# Patient Record
Sex: FEMALE | Race: ASIAN | Employment: FULL TIME | URBAN - METROPOLITAN AREA
[De-identification: names, ages, dates, MRNs, and addresses within clinical notes are randomized per-mention and may not be internally consistent; named-entity substitution may affect disease eponyms.]

---

## 2017-01-22 DIAGNOSIS — I10 ESSENTIAL HYPERTENSION, BENIGN: Primary | ICD-10-CM

## 2017-01-23 RX ORDER — LISINOPRIL 5 MG/1
TABLET ORAL
Qty: 90 TABLET | Refills: 1 | Status: SHIPPED | OUTPATIENT
Start: 2017-01-23 | End: 2017-08-08

## 2017-01-23 NOTE — TELEPHONE ENCOUNTER
Lisinopril      Last Written Prescription Date: 8/22/2016  Last Fill Quantity: 90, # refills: 1  Last Office Visit with G, P or [facility] prescribing provider: 8/22/2016       POTASSIUM   Date Value Ref Range Status   05/26/2016 3.9 3.4 - 5.3 mmol/L Final     CREATININE   Date Value Ref Range Status   05/26/2016 0.65 0.52 - 1.04 mg/dL Final   01/25/2014 0.59 0.52 - 1.04 mg/dL Final     BP Readings from Last 3 Encounters:   08/22/16 119/77   07/20/16 118/82   05/26/16 120/90     Prescription approved per [facility], P or [facility] refill protocol.  Marylin Moser RN  [facility] [employer]

## 2017-08-08 DIAGNOSIS — I10 ESSENTIAL HYPERTENSION, BENIGN: ICD-10-CM

## 2017-08-08 NOTE — TELEPHONE ENCOUNTER
Lisinopirl      Last Written Prescription Date: 1/23/17  Last Fill Quantity: 90, # refills: 1  Last Office Visit with G, P or [facility] prescribing provider: 8/22/16       Potassium   Date Value Ref Range Status   05/26/2016 3.9 3.4 - 5.3 mmol/L Final     Creatinine   Date Value Ref Range Status   05/26/2016 0.65 0.52 - 1.04 mg/dL Final     BP Readings from Last 3 Encounters:   08/22/16 119/77   07/20/16 118/82   05/26/16 120/90

## 2017-08-09 NOTE — TELEPHONE ENCOUNTER
Routing refill request to provider for review/approval because:  Labs not current:  See below    Zara Botello RN  [facility]  [phone]

## 2017-08-10 RX ORDER — LISINOPRIL 5 MG/1
TABLET ORAL
Qty: 30 TABLET | Refills: 0 | Status: SHIPPED | OUTPATIENT
Start: 2017-08-10

## 2017-08-19 DIAGNOSIS — I10 ESSENTIAL HYPERTENSION, BENIGN: ICD-10-CM

## 2017-08-21 RX ORDER — LISINOPRIL 5 MG/1
TABLET ORAL
Qty: 30 TABLET | OUTPATIENT
Start: 2017-08-21

## 2017-08-21 NOTE — TELEPHONE ENCOUNTER
lisinopril (PRINIVIL/ZESTRIL) 5 MG tablet   Last Written Prescription Date: 8/10/17  Last Fill Quantity: 30, # refills: 0  Last Office Visit with G, P or [facility] prescribing provider: 8/22/16    I reached the patient and we set up a px and fasting labs per her request.  She does not need refills before then  Opal Moya RN- [facility] [employer]         Potassium   Date Value Ref Range Status   05/26/2016 3.9 3.4 - 5.3 mmol/L Final     Creatinine   Date Value Ref Range Status   05/26/2016 0.65 0.52 - 1.04 mg/dL Final     BP Readings from Last 3 Encounters:   08/22/16 119/77   07/20/16 118/82   05/26/16 120/90

## 2017-08-30 ENCOUNTER — OFFICE VISIT (OUTPATIENT)
Dept: FAMILY MEDICINE | Facility: CLINIC | Age: 37
End: 2017-08-30
Payer: COMMERCIAL

## 2017-08-30 VITALS
HEART RATE: 73 BPM | TEMPERATURE: 98.8 F | HEIGHT: 64 IN | DIASTOLIC BLOOD PRESSURE: 80 MMHG | OXYGEN SATURATION: 100 % | SYSTOLIC BLOOD PRESSURE: 125 MMHG | BODY MASS INDEX: 22.2 KG/M2 | WEIGHT: 130 LBS

## 2017-08-30 DIAGNOSIS — I10 ESSENTIAL HYPERTENSION, BENIGN: ICD-10-CM

## 2017-08-30 DIAGNOSIS — B07.0 PLANTAR WARTS: ICD-10-CM

## 2017-08-30 DIAGNOSIS — E78.5 HYPERLIPIDEMIA WITH TARGET LDL LESS THAN 100: ICD-10-CM

## 2017-08-30 DIAGNOSIS — Z00.00 ROUTINE GENERAL MEDICAL EXAMINATION AT A HEALTH CARE FACILITY: Primary | ICD-10-CM

## 2017-08-30 DIAGNOSIS — E55.9 VITAMIN D DEFICIENCY: ICD-10-CM

## 2017-08-30 LAB
ALBUMIN SERPL-MCNC: 4.1 G/DL (ref 3.4–5)
ALP SERPL-CCNC: 44 U/L (ref 40–150)
ALT SERPL W P-5'-P-CCNC: 20 U/L (ref 0–50)
ANION GAP SERPL CALCULATED.3IONS-SCNC: 8 MMOL/L (ref 3–14)
AST SERPL W P-5'-P-CCNC: 19 U/L (ref 0–45)
BILIRUB SERPL-MCNC: 0.6 MG/DL (ref 0.2–1.3)
BUN SERPL-MCNC: 12 MG/DL (ref 7–30)
CALCIUM SERPL-MCNC: 8.7 MG/DL (ref 8.5–10.1)
CHLORIDE SERPL-SCNC: 105 MMOL/L (ref 94–109)
CHOLEST SERPL-MCNC: 214 MG/DL
CO2 SERPL-SCNC: 23 MMOL/L (ref 20–32)
CREAT SERPL-MCNC: 0.83 MG/DL (ref 0.52–1.04)
ERYTHROCYTE [DISTWIDTH] IN BLOOD BY AUTOMATED COUNT: 15.8 % (ref 10–15)
GFR SERPL CREATININE-BSD FRML MDRD: 76 ML/MIN/1.7M2
GLUCOSE SERPL-MCNC: 79 MG/DL (ref 70–99)
HCT VFR BLD AUTO: 34.8 % (ref 35–47)
HDLC SERPL-MCNC: 84 MG/DL
HGB BLD-MCNC: 11.3 G/DL (ref 11.7–15.7)
LDLC SERPL CALC-MCNC: 115 MG/DL
MCH RBC QN AUTO: 25.9 PG (ref 26.5–33)
MCHC RBC AUTO-ENTMCNC: 32.5 G/DL (ref 31.5–36.5)
MCV RBC AUTO: 80 FL (ref 78–100)
NONHDLC SERPL-MCNC: 130 MG/DL
PLATELET # BLD AUTO: 238 10E9/L (ref 150–450)
POTASSIUM SERPL-SCNC: 3.7 MMOL/L (ref 3.4–5.3)
PROT SERPL-MCNC: 7.9 G/DL (ref 6.8–8.8)
RBC # BLD AUTO: 4.37 10E12/L (ref 3.8–5.2)
SODIUM SERPL-SCNC: 136 MMOL/L (ref 133–144)
TRIGL SERPL-MCNC: 74 MG/DL
WBC # BLD AUTO: 5.5 10E9/L (ref 4–11)

## 2017-08-30 PROCEDURE — 85027 COMPLETE CBC AUTOMATED: CPT | Performed by: FAMILY MEDICINE

## 2017-08-30 PROCEDURE — 17110 DESTRUCTION B9 LES UP TO 14: CPT | Performed by: FAMILY MEDICINE

## 2017-08-30 PROCEDURE — 80061 LIPID PANEL: CPT | Performed by: FAMILY MEDICINE

## 2017-08-30 PROCEDURE — 36415 COLL VENOUS BLD VENIPUNCTURE: CPT | Performed by: FAMILY MEDICINE

## 2017-08-30 PROCEDURE — 80053 COMPREHEN METABOLIC PANEL: CPT | Performed by: FAMILY MEDICINE

## 2017-08-30 PROCEDURE — 99395 PREV VISIT EST AGE 18-39: CPT | Mod: 25 | Performed by: FAMILY MEDICINE

## 2017-08-30 PROCEDURE — 82306 VITAMIN D 25 HYDROXY: CPT | Performed by: FAMILY MEDICINE

## 2017-08-30 PROCEDURE — 99213 OFFICE O/P EST LOW 20 MIN: CPT | Mod: 25 | Performed by: FAMILY MEDICINE

## 2017-08-30 RX ORDER — LISINOPRIL 2.5 MG/1
2.5 TABLET ORAL DAILY
Qty: 90 TABLET | Refills: 1 | Status: SHIPPED | OUTPATIENT
Start: 2017-08-30 | End: 2018-09-15

## 2017-08-30 NOTE — NURSING NOTE
"Chief Complaint   Patient presents with     Physical       Initial /80  Pulse 73  Temp 98.8  F (37.1  C) (Tympanic)  Ht 5' 4\" (1.626 m)  Wt 130 lb (59 kg)  LMP 08/11/2017  SpO2 100%  BMI 22.31 kg/m2 Estimated body mass index is 22.31 kg/(m^2) as calculated from the following:    Height as of this encounter: 5' 4\" (1.626 m).    Weight as of this encounter: 130 lb (59 kg).  Medication Reconciliation: complete  "

## 2017-08-30 NOTE — MR AVS SNAPSHOT
After Visit Summary   8/30/2017    Pallavi Mondkar Dasgupta    MRN: [id number]           Patient Information     Date Of Birth          1980        Visit Information        Provider Department      8/30/2017 8:40 AM Zuleyma Ambrose MD [facility]        Today's Diagnoses     Routine general medical examination at a health care facility    -  1    Hyperlipidemia with target LDL less than 100        Essential hypertension, benign        Vitamin D deficiency        Plantar warts          Care Instructions      Preventive Health Recommendations  Female Ages 26 - 39  Yearly exam:   See your health care provider every year in order to    Review health changes.     Discuss preventive care.      Review your medicines if you your doctor has prescribed any.    Until age 30: Get a Pap test every three years (more often if you have had an abnormal result).    After age 30: Talk to your doctor about whether you should have a Pap test every 3 years or have a Pap test with HPV screening every 5 years.   You do not need a Pap test if your uterus was removed (hysterectomy) and you have not had cancer.  You should be tested each year for STDs (sexually transmitted diseases), if you're at risk.   Talk to your provider about how often to have your cholesterol checked.  If you are at risk for diabetes, you should have a diabetes test (fasting glucose).  Shots: Get a flu shot each year. Get a tetanus shot every 10 years.   Nutrition:     Eat at least 5 servings of fruits and vegetables each day.    Eat whole-grain bread, whole-wheat pasta and brown rice instead of white grains and rice.    Talk to your provider about Calcium and Vitamin D.     Lifestyle    Exercise at least 150 minutes a week (30 minutes a day, 5 days of the week). This will help you control your weight and prevent disease.    Limit alcohol to one drink per day.    No smoking.     Wear sunscreen to prevent skin cancer.    See  your dentist every six months for an exam and cleaning.    Understanding Plantar Warts    A plantar wart is a small noncancerous growth on the bottom of the foot. Plantar warts often develop where friction or pressure occurs, such as on the ball of the foot. The word plantar refers to the sole of the foot. Similar warts can occur on other areas of the body such as the hands. Plantar warts are more common in children and young adults.  What causes a plantar wart?  Plantar warts are caused by a virus called human papillomavirus (HPV).  They can be spread by person-to-person contact. Or you can develop one if you walk barefoot on moist surfaces infected with the virus, such as in a community pool area or locker rooms. Wearing proper footwear in such places can prevent them.  What are the symptoms of plantar warts?  Plantar warts cause a thick patch of skin on the bottom of the foot. The wart may have black dots on it. These dots are dried blood. The wart may cause pain or discomfort. You may also have trouble walking because of the pain.  How are plantar warts treated?  Many plantar warts go away without any treatment. But for those that are painful or that don t go away, several treatments are available. These include:    Salicylic acid. This treatment is applied directly to the wart. It may come in the form of a liquid, ointment, pad, or patch. It is available over the counter.    Cryotherapy.  Your healthcare provider puts liquid nitrogen on the wart with a cotton swab. This treatment can be painful.    Duct tape. One study shows a benefit by putting duct tape on the wart for 6 days. You then soak the wart and scrape it with an emery board. This is repeated until the wart is gone or for 2 months. Other studies show this does not work well to remove the wart.    Medicine. A variety of medicines can be put on or injected into the wart. But research is mixed on how well they work.  Often your healthcare provider will  cut away dead parts of the wart before also using one of these other treatments.    When should I call my healthcare provider?  Call your healthcare provider if you have plantar warts that become too painful and do not go away on their own or with over-the-counter and at home treatments.   Date Last Reviewed: 3/30/2016    3897-9144 [employer]. [address] [place] [address], [place] [address]. All rights reserved. This information is not intended as a substitute for professional medical care. Always follow your healthcare professional's instructions.                Follow-ups after your visit        Who to contact     If you have questions or need follow up information about today's clinic visit or your schedule please contact [facility] [place] directly at [phone].  Normal or non-critical lab and imaging results will be communicated to you by MyChart, letter or phone within 4 business days after the clinic has received the results. If you do not hear from us within 7 days, please contact the clinic through MyChart or phone. If you have a critical or abnormal lab result, we will notify you by phone as soon as possible.  Submit refill requests through [facility] or call your pharmacy and they will forward the refill request to us. Please allow 3 business days for your refill to be completed.          Additional Information About Your Visit        [facility] Information     [facility] gives you secure access to your electronic health record. If you see a primary care provider, you can also send messages to your care team and make appointments. If you have questions, please call your primary care clinic.  If you do not have a primary care provider, please call [phone] and they will assist you.        Care EveryWhere ID     This is your Care EveryWhere ID. This could be used by other organizations to access your [place] medical records  [id number]        Your Vitals Were     Pulse Temperature  "Height Last Period Pulse Oximetry BMI (Body Mass Index)    73 98.8  F (37.1  C) (Tympanic) 5' 4\" (1.626 m) 08/11/2017 100% 22.31 kg/m2       Blood Pressure from Last 3 Encounters:   08/30/17 125/80   08/22/16 119/77   07/20/16 118/82    Weight from Last 3 Encounters:   08/30/17 130 lb (59 kg)   08/22/16 128 lb 9.6 oz (58.3 kg)   05/26/16 136 lb (61.7 kg)              We Performed the Following     CBC with platelets     Comprehensive metabolic panel     DESTRUCT BENIGN LESION, UP TO 14     LIPID REFLEX TO DIRECT LDL PANEL     Vitamin D Deficiency          Today's Medication Changes          These changes are accurate as of: 8/30/17  9:30 AM.  If you have any questions, ask your nurse or doctor.               These medicines have changed or have updated prescriptions.        Dose/Directions    * lisinopril 5 MG tablet   Commonly known as:  PRINIVIL/ZESTRIL   This may have changed:  Another medication with the same name was added. Make sure you understand how and when to take each.   Used for:  Essential hypertension, benign   Changed by:  Zuleyma Ambrose MD        Take 1 tablet by mouth  daily   Quantity:  30 tablet   Refills:  0       * lisinopril 2.5 MG tablet   Commonly known as:  PRINIVIL/Zestril   This may have changed:  You were already taking a medication with the same name, and this prescription was added. Make sure you understand how and when to take each.   Used for:  Essential hypertension, benign   Changed by:  Zuleyma Ambrose MD        Dose:  2.5 mg   Take 1 tablet (2.5 mg) by mouth daily   Quantity:  90 tablet   Refills:  1       * Notice:  This list has 2 medication(s) that are the same as other medications prescribed for you. Read the directions carefully, and ask your doctor or other care provider to review them with you.         Where to get your medicines      These medications were sent to [facility] [place] [address], " [facility] [address]     Phone:  [phone]     lisinopril 2.5 MG tablet                Primary Care Provider Office Phone # Fax #    Zuleyma Ambrose [phone]       [address]  [place] [address]        Equal Access to Services     [facility]: Hadii aad ku hadasho Soomaali, waaxda luqadaha, qaybta kaalmada adeegyada, waxay idiin hayaan adeeg khshobhash la'aan . So [facility] [phone].    ATENCIÓN: Si habla español, tiene a dye disposición servicios gratuitos de asistencia lingüística. Llame al [phone].    We comply with applicable federal civil rights laws and [place] laws. We do not discriminate on the basis of race, color, national origin, age, disability sex, sexual orientation or gender identity.            Thank you!     Thank you for choosing [facility] [place]  for your care. Our goal is always to provide you with excellent care. Hearing back from our patients is one way we can continue to improve our services. Please take a few minutes to complete the written survey that you may receive in the mail after your visit with us. Thank you!             Your Updated Medication List - Protect others around you: Learn how to safely use, store and throw away your medicines at www.disposemymeds.org.          This list is accurate as of: 8/30/17  9:30 AM.  Always use your most recent med list.                   Brand Name Dispense Instructions for use Diagnosis    ASPIRIN PO      Take 81 mg by mouth daily        * lisinopril 5 MG tablet    PRINIVIL/ZESTRIL    30 tablet    Take 1 tablet by mouth  daily    Essential hypertension, benign       * lisinopril 2.5 MG tablet    PRINIVIL/Zestril    90 tablet    Take 1 tablet (2.5 mg) by mouth daily    Essential hypertension, benign       VITAMIN D (CHOLECALCIFEROL) PO      Take 5,000 Units by mouth daily        * Notice:  This list has 2 medication(s) that are the same as other medications prescribed for you. Read the  directions carefully, and ask your doctor or other care provider to review them with you.

## 2017-08-30 NOTE — PATIENT INSTRUCTIONS
Preventive Health Recommendations  Female Ages 26 - 39  Yearly exam:   See your health care provider every year in order to    Review health changes.     Discuss preventive care.      Review your medicines if you your doctor has prescribed any.    Until age 30: Get a Pap test every three years (more often if you have had an abnormal result).    After age 30: Talk to your doctor about whether you should have a Pap test every 3 years or have a Pap test with HPV screening every 5 years.   You do not need a Pap test if your uterus was removed (hysterectomy) and you have not had cancer.  You should be tested each year for STDs (sexually transmitted diseases), if you're at risk.   Talk to your provider about how often to have your cholesterol checked.  If you are at risk for diabetes, you should have a diabetes test (fasting glucose).  Shots: Get a flu shot each year. Get a tetanus shot every 10 years.   Nutrition:     Eat at least 5 servings of fruits and vegetables each day.    Eat whole-grain bread, whole-wheat pasta and brown rice instead of white grains and rice.    Talk to your provider about Calcium and Vitamin D.     Lifestyle    Exercise at least 150 minutes a week (30 minutes a day, 5 days of the week). This will help you control your weight and prevent disease.    Limit alcohol to one drink per day.    No smoking.     Wear sunscreen to prevent skin cancer.    See your dentist every six months for an exam and cleaning.    Understanding Plantar Warts    A plantar wart is a small noncancerous growth on the bottom of the foot. Plantar warts often develop where friction or pressure occurs, such as on the ball of the foot. The word plantar refers to the sole of the foot. Similar warts can occur on other areas of the body such as the hands. Plantar warts are more common in children and young adults.  What causes a plantar wart?  Plantar warts are caused by a virus called human papillomavirus (HPV).  They can be  spread by person-to-person contact. Or you can develop one if you walk barefoot on moist surfaces infected with the virus, such as in a community pool area or locker rooms. Wearing proper footwear in such places can prevent them.  What are the symptoms of plantar warts?  Plantar warts cause a thick patch of skin on the bottom of the foot. The wart may have black dots on it. These dots are dried blood. The wart may cause pain or discomfort. You may also have trouble walking because of the pain.  How are plantar warts treated?  Many plantar warts go away without any treatment. But for those that are painful or that don t go away, several treatments are available. These include:    Salicylic acid. This treatment is applied directly to the wart. It may come in the form of a liquid, ointment, pad, or patch. It is available over the counter.    Cryotherapy.  Your healthcare provider puts liquid nitrogen on the wart with a cotton swab. This treatment can be painful.    Duct tape. One study shows a benefit by putting duct tape on the wart for 6 days. You then soak the wart and scrape it with an emery board. This is repeated until the wart is gone or for 2 months. Other studies show this does not work well to remove the wart.    Medicine. A variety of medicines can be put on or injected into the wart. But research is mixed on how well they work.  Often your healthcare provider will cut away dead parts of the wart before also using one of these other treatments.    When should I call my healthcare provider?  Call your healthcare provider if you have plantar warts that become too painful and do not go away on their own or with over-the-counter and at home treatments.   Date Last Reviewed: 3/30/2016    6309-2816 The "[employer]. [address] [place] [address], [place] [address]. All rights reserved. This information is not intended as a substitute for professional medical care. Always follow your healthcare professional's  instructions.

## 2017-08-30 NOTE — PROGRESS NOTES
SUBJECTIVE:   CC: Pallavi Mondkar Dasgupta is an 36 year old woman who presents for preventive health visit.     Healthy Habits:    Do you get at least three servings of calcium containing foods daily (dairy, green leafy vegetables, etc.)? yes    Amount of exercise or daily activities, outside of work: 3 day(s) per week    Problems taking medications regularly No    Medication side effects: No    Have you had an eye exam in the past two years? yes    Do you see a dentist twice per year? yes    Do you have sleep apnea, excessive snoring or daytime drowsiness?no          PROBLEMS TO ADD ON...  1. Has wart rt foot x 1 month, she has bene using OTC helps some but still not going away lately it is becoming uncomfortable so wanted to get checked has digna swimming and around pool so she thinks  that's how she may have got it    2. has hx of low vitamin d, although has not been taking much supplement now, so wants labs to see if she can maintain on her own without taking too much supplement     3. Also need new script for her BP med's, Results are 120/80 or less, so she is only taking 1/2 tab and she thinks  it works well,  so wants script changed .  Watching diet , Low Salt Diet: no added salt. Due for labs     Hyperlipidemia Follow-Up      Rate your low fat/cholesterol diet?: good    Taking statin?  Yes, no muscle aches from statin    Other lipid medications/supplements?:  none                      Today's PHQ-2 Score:   PHQ-2 ( 1999 Pfizer) 8/30/2017 8/22/2016   Q1: Little interest or pleasure in doing things 0 0   Q2: Feeling down, depressed or hopeless 0 0   PHQ-2 Score 0 0         Abuse: Current or Past(Physical, Sexual or Emotional)- No  Do you feel safe in your environment - Yes  Social History   Substance Use Topics     Smoking status: Never Smoker     Smokeless tobacco: Never Used     Alcohol use No     The patient does not drink >3 drinks per day nor >7 drinks per week.    Reviewed orders with patient.   Reviewed health maintenance and updated orders accordingly - Yes  Patient Active Problem List   Diagnosis     Cerebral vascular accident (H)     ASCUS with positive high risk HPV     PIH (pregnancy induced hypertension)     Carotid artery dissection (H)     Hyperlipidemia with target LDL less than 100     Vitamin D deficiency     Essential hypertension, benign     Fibroadenoma, right     Past Surgical History:   Procedure Laterality Date      SECTION  2014    Procedure:  SECTION;  Time of birth: 09:03AM;  Surgeon: Lore Ramos MD;  Location:  OR     NO HISTORY OF SURGERY         Social History   Substance Use Topics     Smoking status: Never Smoker     Smokeless tobacco: Never Used     Alcohol use No     Family History   Problem Relation Age of Onset     Lipids Father      Hypertension Maternal Grandmother      DIABETES Maternal Grandfather      HEART DISEASE Sister      had PPH,  at age 26              Mammogram not appropriate for this patient based on age.    Pertinent mammograms are reviewed under the imaging tab.  History of abnormal Pap smear: NO - age 30- 65 PAP every 3 years recommended    Reviewed and updated as needed this visit by clinical staff         Reviewed and updated as needed this visit by Provider        Past Medical History:   Diagnosis Date     ASCUS with positive high risk HPV 13    HPV 16, colposcopy benign     Dissection of vertebral artery (H)     left vertebral artery     Hypertension     chronic hypertension     Migraine      stroke     in cerebellum,         ROS:  C: NEGATIVE for fever, chills, change in weight  I: NEGATIVE for worrisome rashes, moles or lesions  INTEGUMENTARY/SKIN: wart as per HPI   E: NEGATIVE for vision changes or irritation  ENT: NEGATIVE for ear, mouth and throat problems  R: NEGATIVE for significant cough or SOB  B: NEGATIVE for masses, tenderness or discharge  CV: NEGATIVE for chest pain, palpitations or peripheral  edema  GI: NEGATIVE for nausea, abdominal pain, heartburn, or change in bowel habits  : NEGATIVE for unusual urinary or vaginal symptoms. Periods are regular.  M: NEGATIVE for significant arthralgias or myalgia  N: NEGATIVE for weakness, dizziness or paresthesias  E: NEGATIVE for temperature intolerance, skin/hair changes  H: NEGATIVE for bleeding problems  P: NEGATIVE for changes in mood or affect    OBJECTIVE:   There were no vitals taken for this visit.  EXAM:  GENERAL APPEARANCE: healthy, alert and no distress  EYES: Eyes grossly normal to inspection, PERRL and conjunctivae and sclerae normal  HENT: ear canals and TM's normal, nose and mouth without ulcers or lesions, oropharynx clear and oral mucous membranes moist  NECK: no adenopathy, no asymmetry, masses, or scars and thyroid normal to palpation  RESP: lungs clear to auscultation - no rales, rhonchi or wheezes  BREAST: normal without masses, tenderness or nipple discharge and no palpable axillary masses or adenopathy  CV: regular rate and rhythm, normal S1 S2, no S3 or S4, no murmur, click or rub, no peripheral edema and peripheral pulses strong  ABDOMEN: soft, nontender, no hepatosplenomegaly, no masses and bowel sounds normal   (female): deferred  MS: no musculoskeletal defects are noted and gait is age appropriate without ataxia  SKIN: no suspicious lesions or rashes except large  wart - foot right, mid foot it has lot of dead skin built up bc of previous OTC treatment and somewhat tender and sensitive   NEURO: Normal strength and tone, sensory exam grossly normal, mentation intact and speech normal  PSYCH: mentation appears normal and affect normal/bright    ASSESSMENT/PLAN:   (Z00.00) Routine general medical examination at a health care facility  (primary encounter diagnosis)  Comment:   Plan:     (E78.5) Hyperlipidemia with target LDL less than 100  Comment:   Plan: LIPID REFLEX TO DIRECT LDL PANEL, Comprehensive        metabolic panel         "    (I10) Essential hypertension, benign  Comment:   Plan: LIPID REFLEX TO DIRECT LDL PANEL, Comprehensive        metabolic panel, CBC with platelets, lisinopril        (PRINIVIL/ZESTRIL) 2.5 MG tablet        BP in adequate control. She has decreased her dose recently, although her BP has remained good, so she is comfortable continuing on low dose. So new script faxed.         Discussed cares, low fat low salt diet etc. Check labs, call pt with results. encouraged home BP monitoring. Follow up recheck in 6 months, sooner if problem.       (E55.9) Vitamin D deficiency  Comment: not taking supplement lately,so wants labs   Plan: Vitamin D Deficiency            (B07.0) Plantar warts  Comment: rt foot, one  large wart   Plan: DESTRUCT BENIGN LESION, UP TO 14            The etiology of common warts were discussed. Discussed treatment options. She wish to proceed with liquid nitrogen.  wart shaved and then treated   with liquid nitrogen.  Band aid applied. Patient tolerated procedure well.       The patient is to return in 2-3  weeks until if  warts have persist.         COUNSELING:   Reviewed preventive health counseling, as reflected in patient instructions       Regular exercise       Healthy diet/nutrition       Vision screening         reports that she has never smoked. She has never used smokeless tobacco.    Estimated body mass index is 21.73 kg/(m^2) as calculated from the following:    Height as of 8/22/16: 5' 4.5\" (1.638 m).    Weight as of 8/22/16: 128 lb 9.6 oz (58.3 kg).       HCM issues discussed. Diet/ exercise discussed. Check labs , call patiens with results. follow up as needed.         Counseling Resources:  ATP IV Guidelines  Pooled Cohorts Equation Calculator  Breast Cancer Risk Calculator  FRAX Risk Assessment  ICSI Preventive Guidelines  Dietary Guidelines for Americans, 2010  USDA's MyPlate  ASA Prophylaxis  Lung CA Screening    Zuleyma Ambrose MD  [facility] [place]  "

## 2017-08-31 LAB — DEPRECATED CALCIDIOL+CALCIFEROL SERPL-MC: 31 UG/L (ref 20–75)

## 2017-09-05 PROBLEM — B07.0 PLANTAR WARTS: Status: ACTIVE | Noted: 2017-09-05

## 2017-09-18 ENCOUNTER — OFFICE VISIT (OUTPATIENT)
Dept: FAMILY MEDICINE | Facility: CLINIC | Age: 37
End: 2017-09-18
Payer: COMMERCIAL

## 2017-09-18 VITALS
HEIGHT: 64 IN | TEMPERATURE: 98.5 F | SYSTOLIC BLOOD PRESSURE: 128 MMHG | OXYGEN SATURATION: 99 % | BODY MASS INDEX: 22.71 KG/M2 | HEART RATE: 66 BPM | WEIGHT: 133 LBS | DIASTOLIC BLOOD PRESSURE: 82 MMHG

## 2017-09-18 DIAGNOSIS — B07.0 PLANTAR WARTS: Primary | ICD-10-CM

## 2017-09-18 PROCEDURE — 17110 DESTRUCTION B9 LES UP TO 14: CPT | Performed by: FAMILY MEDICINE

## 2017-09-18 NOTE — NURSING NOTE
"Chief Complaint   Patient presents with     Wart     treatment        Initial /87  Pulse 66  Temp 98.5  F (36.9  C) (Tympanic)  Ht 5' 4\" (1.626 m)  Wt 133 lb (60.3 kg)  LMP 08/06/2017  SpO2 99%  BMI 22.83 kg/m2 Estimated body mass index is 22.83 kg/(m^2) as calculated from the following:    Height as of this encounter: 5' 4\" (1.626 m).    Weight as of this encounter: 133 lb (60.3 kg).  Medication Reconciliation: complete  "

## 2017-09-18 NOTE — MR AVS SNAPSHOT
"              After Visit Summary   9/18/2017    Pallavi Mondkar Dasgupta    MRN: [id number]           Patient Information     Date Of Birth          1980        Visit Information        Provider Department      9/18/2017 3:40 PM Zuleyma Ambrose MD [facility] [place]        Today's Diagnoses     Plantar warts    -  1      Care Instructions    Cares and  treatment discussed follow up if problem               Follow-ups after your visit        Who to contact     If you have questions or need follow up information about today's clinic visit or your schedule please contact [facility] [place] directly at [phone].  Normal or non-critical lab and imaging results will be communicated to you by MyChart, letter or phone within 4 business days after the clinic has received the results. If you do not hear from us within 7 days, please contact the clinic through [facility] or phone. If you have a critical or abnormal lab result, we will notify you by phone as soon as possible.  Submit refill requests through [employer] or call your pharmacy and they will forward the refill request to us. Please allow 3 business days for your refill to be completed.          Additional Information About Your Visit        MyChart Information     [employer] gives you secure access to your electronic health record. If you see a primary care provider, you can also send messages to your care team and make appointments. If you have questions, please call your primary care clinic.  If you do not have a primary care provider, please call [phone] and they will assist you.        Care EveryWhere ID     This is your Care EveryWhere ID. This could be used by other organizations to access your [facility] medical records  [id number]        Your Vitals Were     Pulse Temperature Height Last Period Pulse Oximetry BMI (Body Mass Index)    66 98.5  F (36.9  C) (Tympanic) 5' 4\" (1.626 m) 08/06/2017 99% 22.83 kg/m2       Blood " Pressure from Last 3 Encounters:   09/18/17 128/82   08/30/17 125/80   08/22/16 119/77    Weight from Last 3 Encounters:   09/18/17 133 lb (60.3 kg)   08/30/17 130 lb (59 kg)   08/22/16 128 lb 9.6 oz (58.3 kg)              We Performed the Following     DESTRUCT BENIGN LESION, UP TO 14        Primary Care Provider Office Phone # Fax #    Zuleyma Ambrose [phone]       [address]  [place] [address]        Equal Access to Services     [facility]: Hadii aad ku hadasho Soomaali, waaxda luqadaha, qaybta kaalmada adeegyada, edna raman . So [facility] [phone].    ATENCIÓN: Si habla español, tiene a dye disposición servicios gratuitos de asistencia lingüística. Llame al [phone].    We comply with applicable federal civil rights laws and [place] laws. We do not discriminate on the basis of race, color, national origin, age, disability sex, sexual orientation or gender identity.            Thank you!     Thank you for choosing [facility] [place]  for your care. Our goal is always to provide you with excellent care. Hearing back from our patients is one way we can continue to improve our services. Please take a few minutes to complete the written survey that you may receive in the mail after your visit with us. Thank you!             Your Updated Medication List - Protect others around you: Learn how to safely use, store and throw away your medicines at www.disposemymeds.org.          This list is accurate as of: 9/18/17  4:39 PM.  Always use your most recent med list.                   Brand Name Dispense Instructions for use Diagnosis    ASPIRIN PO      Take 81 mg by mouth daily        * lisinopril 5 MG tablet    PRINIVIL/ZESTRIL    30 tablet    Take 1 tablet by mouth  daily    Essential hypertension, benign       * lisinopril 2.5 MG tablet    PRINIVIL/Zestril    90 tablet    Take 1 tablet (2.5 mg) by mouth daily    Essential  hypertension, benign       VITAMIN D (CHOLECALCIFEROL) PO      Take 5,000 Units by mouth daily        * Notice:  This list has 2 medication(s) that are the same as other medications prescribed for you. Read the directions carefully, and ask your doctor or other care provider to review them with you.

## 2017-09-18 NOTE — PROGRESS NOTES
"  SUBJECTIVE:   Pallavi Mondkar Dasgupta is a 36 year old female who presents to clinic today for the following health issues:      WART(S)      Onset: Recheck , looking slightly better after last treatment     Description (location/number): Right foot     Accompanying signs and symptoms: Painful: not any more     History: prior warts: YES    Therapies tried and outcome: treated          Problem list and histories reviewed & adjusted, as indicated.  Additional history: as documented    Patient Active Problem List   Diagnosis     Cerebral vascular accident (H)     ASCUS with positive high risk HPV     PIH (pregnancy induced hypertension)     Carotid artery dissection (H)     Hyperlipidemia with target LDL less than 100     Vitamin D deficiency     Essential hypertension, benign     Fibroadenoma, right     Plantar warts     Past Surgical History:   Procedure Laterality Date      SECTION  2014    Procedure:  SECTION;  Time of birth: 09:03AM;  Surgeon: Lore Ramos MD;  Location: [facility] OR     NO HISTORY OF SURGERY         Social History   Substance Use Topics     Smoking status: Never Smoker     Smokeless tobacco: Never Used     Alcohol use No     Family History   Problem Relation Age of Onset     Lipids Father      Hypertension Maternal Grandmother      DIABETES Maternal Grandfather      HEART DISEASE Sister      had PPH,  at age 26      Coronary Artery Disease Other      maternal uncle  of cardiac arrest at age 39      Breast Cancer No family hx of      Colon Cancer No family hx of      CEREBROVASCULAR DISEASE No family hx of              Reviewed and updated as needed this visit by clinical staff     Reviewed and updated as needed this visit by Provider         ROS:  Constitutional, HEENT, cardiovascular, pulmonary, gi and gu systems are negative, except as otherwise noted.      OBJECTIVE:   /82  Pulse 66  Temp 98.5  F (36.9  C) (Tympanic)  Ht 5' 4\" (1.626 m)  Wt 133 lb " (60.3 kg)  LMP 08/06/2017  SpO2 99%  BMI 22.83 kg/m2  Body mass index is 22.83 kg/(m^2).  GENERAL: healthy, alert and no distress    SKIN:has large  wart under rt mid foot , looks much better after  shaving         ASSESSMENT/PLAN:         (B07.0) Plantar warts  (primary encounter diagnosis)  Comment: rt foot, large, slight improvement   Plan: DESTRUCT BENIGN LESION, UP TO 14               discussed cares , concerns and talked about treatment . She wish to proceed with treatment.   Wart shaved and  lesion frozen with LN x2. Patient tolerated procedure well.   Band-Aid applied. Skin cares discussed follow up in  2-3 weeks if wart persist, sooner if problem .         Zuleyma Ambrose MD  [facility] [place]

## 2017-12-30 ENCOUNTER — VIRTUAL VISIT (OUTPATIENT)
Dept: FAMILY MEDICINE | Facility: OTHER | Age: 37
End: 2017-12-30

## 2017-12-30 NOTE — PROGRESS NOTES
"Date:   Clinician: Mariia Engel  Clinician NPI: [id number]  Patient: Pallavi Mondkar  Patient : 1980  Patient Address: [address] [place] [id number]  Patient Phone: [phone]  Visit Protocol: Eye conditions  Patient Summary:  Pallavi is a 37 year old (: 1980 ) female who initiated a Visit for conjunctivitis.  When asked the question \"Please sign me up to receive news, health information and promotions. \", Pallavi responded \"No\".    Images of her eye condition were uploaded.   Her symptoms started today and affect both eyes. The symptoms consist of drainage coming from the eye(s), itchy eye(s), and eye redness.   Symptom details     Drainage: The color of the drainage coming out of her eye(s) is white. The drainage is watery and causes her eyelids to be stuck shut in the morning.    Itchiness: Pallavi does not have seasonal allergies or hay fever.     Denied symptoms include eyelid swelling, eye pain, bumps on the eyelid, and light sensitivity. Pallavi has not experienced a decrease in vision and does not have subconjunctival hemorrhage. She does not feel feverish.    Pallavi has not had a recent diagnosis of conjunctivitis. She also has not had a recent cold or ear infection, eye injury, foreign body in the eye(s), and eye surgery. It is not known if Pallavi has recently been exposed to someone with a red eye or an eye infection. She does not wear contact lenses. Pallavi has not ever been diagnosed with glaucoma.   Pallavi is not taking medication to treat her current symptoms.   Pallavi does not require proof of evaluation of her eye condition before returning to school, work, or .   Pallavi does not smoke or use smokeless tobacco.   She denies pregnancy and denies breastfeeding. She has menstruated in the past month.   MEDICATIONS:  Lisinopril  , ALLERGIES:  NKDA   Clinician Response:  Dear Pallavi,  Based on the information provided, you most likely have viral " conjunctivitis, more commonly called pink eye. There are no drops or ointments available to treat conjunctivitis caused by a virus. Specifically, antibiotics will not cure a viral infection or make it less contagious.  To reduce the spread of the eye infection, you should not use eye makeup until the infection has fully resolved, and be sure to wash your hands at least once per hour and avoid touching the eyes as much as possible.  The following will reduce the risk for future eye infections:     Frequent handwashing    Replace towels and washcloths daily    Do not share towels and washcloths with others    Replace eye makeup used while eyes were infected    Do not use anyone else's eye makeup     Follow up with your provider if symptoms are not improving after a week. Be seen earlier if you develop any new or worsening symptoms.  I am providing you with a promo code for a free Visit. This code will  in two weeks and may only be used once. Please redeem your free Visit by entering the following promo code on the payment screen: [id number]   Diagnosis: Viral conjunctivitis  Diagnosis ICD: B30.8  Diagnosis ICD: 462.0

## 2018-06-04 ENCOUNTER — HEALTH MAINTENANCE LETTER (OUTPATIENT)
Age: 38
End: 2018-06-04

## 2020-02-23 ENCOUNTER — HEALTH MAINTENANCE LETTER (OUTPATIENT)
Age: 40
End: 2020-02-23

## 2020-12-06 ENCOUNTER — HEALTH MAINTENANCE LETTER (OUTPATIENT)
Age: 40
End: 2020-12-06

## 2021-04-11 ENCOUNTER — HEALTH MAINTENANCE LETTER (OUTPATIENT)
Age: 41
End: 2021-04-11

## 2021-09-25 ENCOUNTER — HEALTH MAINTENANCE LETTER (OUTPATIENT)
Age: 41
End: 2021-09-25

## 2022-05-07 ENCOUNTER — HEALTH MAINTENANCE LETTER (OUTPATIENT)
Age: 42
End: 2022-05-07

## 2023-04-22 ENCOUNTER — HEALTH MAINTENANCE LETTER (OUTPATIENT)
Age: 43
End: 2023-04-22